# Patient Record
Sex: FEMALE | Race: WHITE | URBAN - METROPOLITAN AREA
[De-identification: names, ages, dates, MRNs, and addresses within clinical notes are randomized per-mention and may not be internally consistent; named-entity substitution may affect disease eponyms.]

---

## 2021-09-01 ENCOUNTER — OFFICE VISIT (OUTPATIENT)
Dept: OBGYN CLINIC | Facility: CLINIC | Age: 31
End: 2021-09-01

## 2021-09-01 VITALS
RESPIRATION RATE: 16 BRPM | WEIGHT: 228 LBS | SYSTOLIC BLOOD PRESSURE: 130 MMHG | HEART RATE: 97 BPM | HEIGHT: 64.5 IN | BODY MASS INDEX: 38.45 KG/M2 | DIASTOLIC BLOOD PRESSURE: 70 MMHG

## 2021-09-01 DIAGNOSIS — R10.2 PELVIC PAIN IN FEMALE: ICD-10-CM

## 2021-09-01 DIAGNOSIS — N92.6 IRREGULAR MENSES: Primary | ICD-10-CM

## 2021-09-01 PROCEDURE — 99203 OFFICE O/P NEW LOW 30 MIN: CPT | Performed by: OBSTETRICS & GYNECOLOGY

## 2021-09-01 PROCEDURE — 3075F SYST BP GE 130 - 139MM HG: CPT | Performed by: OBSTETRICS & GYNECOLOGY

## 2021-09-01 PROCEDURE — 3078F DIAST BP <80 MM HG: CPT | Performed by: OBSTETRICS & GYNECOLOGY

## 2021-09-01 PROCEDURE — 3008F BODY MASS INDEX DOCD: CPT | Performed by: OBSTETRICS & GYNECOLOGY

## 2021-09-01 RX ORDER — IBUPROFEN 200 MG
200 TABLET ORAL EVERY 6 HOURS PRN
COMMUNITY

## 2021-09-01 NOTE — PROGRESS NOTES
Vito Sheppard is a 33year old female  Patient's last menstrual period was 2021 (exact date). Patient presents with:  Pelvic Pain: times 6 months on and off, noticed brownish discharge 2 days ago  .      Her periods are very regular she bleeds Never      Sexual activity: Yes        Partners: Male    Other Topics      Concerns:        Not on file    Social History Narrative      Not on file    Social Determinants of Health  Financial Resource Strain:       Difficulty of Paying Living Expenses: edema, no cyanosis  Psychiatric:  Oriented to time, place, person and situation.  Appropriate mood and affect    Pelvic Exam:  External Genitalia: normal appearance, hair distribution, and no lesions  Urethral Meatus:  normal in size, location, without lesi

## 2021-09-02 ENCOUNTER — TELEPHONE (OUTPATIENT)
Dept: OBGYN CLINIC | Facility: CLINIC | Age: 31
End: 2021-09-02

## 2021-09-02 DIAGNOSIS — R10.2 PELVIC PAIN IN FEMALE: Primary | ICD-10-CM

## 2021-09-02 NOTE — TELEPHONE ENCOUNTER
Clarified with Dr Keshawn Lombardi. Patient is a self pay and would like Abdominal complete added for her to have done tomorrow along with her pelvis US. Kvng Dorsey with Dr Keshawn Lombardi. Informed patient order placed and she is aware of the cost already. Routed.  Understanding elyssa

## 2021-09-02 NOTE — TELEPHONE ENCOUNTER
Pt just called because she wants to know if we can add to the order for her pelvic ultrasound (transvaginal & abdominal) the order to *abdominal complete* she wants to make sure they check everything (all the organs) because she has a lot of pain in her ab

## 2021-09-10 ENCOUNTER — TELEPHONE (OUTPATIENT)
Dept: OBGYN CLINIC | Facility: CLINIC | Age: 31
End: 2021-09-10

## 2021-09-10 NOTE — TELEPHONE ENCOUNTER
Pt advised results have been abstracted. Need to be reviewed by provider and given recommendations. Provider not in office now or Monday. Understanding verbalized.

## 2021-09-10 NOTE — TELEPHONE ENCOUNTER
Pt called back to state she called [facility] and they have confirmation of the fax from last Friday but they also faxed again right now.

## 2021-09-10 NOTE — TELEPHONE ENCOUNTER
Pt is very upset that no one has called her about test results. Pt states she got the [address] done last Friday and [facility] told her they faxed the results. Advised pt I do not see them abstracted but that the nurses will contact her with results.   Asked pt to p

## 2021-09-10 NOTE — TELEPHONE ENCOUNTER
Received records from [address] for [address] [facility] [facility] [address].

## 2021-09-14 NOTE — TELEPHONE ENCOUNTER
Spoke with patient, advised per KD she may start ocp or have iud placed to suppress cyst formation can also do follow-up ultrasound in 2-3 months to check status of ovarian cyst. Understanding verbalized.    Will speak with  and call us back in about

## 2021-09-14 NOTE — TELEPHONE ENCOUNTER
Spoke with patient, advised per KD no additional recommendations at this time. Pt would like to know what she can due about right sided pelvic pain she is having since she has had it for 2 months and is experiencing it nearly every night.  Uses ibuprofen wi

## 2021-10-01 ENCOUNTER — TELEPHONE (OUTPATIENT)
Dept: OBGYN CLINIC | Facility: CLINIC | Age: 31
End: 2021-10-01